# Patient Record
Sex: MALE | HISPANIC OR LATINO | Employment: UNEMPLOYED | URBAN - METROPOLITAN AREA
[De-identification: names, ages, dates, MRNs, and addresses within clinical notes are randomized per-mention and may not be internally consistent; named-entity substitution may affect disease eponyms.]

---

## 2020-03-07 ENCOUNTER — HOSPITAL ENCOUNTER (EMERGENCY)
Facility: HOSPITAL | Age: 35
Discharge: HOME | End: 2020-03-07
Attending: EMERGENCY MEDICINE | Admitting: EMERGENCY MEDICINE

## 2020-03-07 VITALS
DIASTOLIC BLOOD PRESSURE: 97 MMHG | SYSTOLIC BLOOD PRESSURE: 146 MMHG | WEIGHT: 205 LBS | HEIGHT: 70 IN | TEMPERATURE: 98.7 F | HEART RATE: 94 BPM | BODY MASS INDEX: 29.35 KG/M2 | OXYGEN SATURATION: 99 % | RESPIRATION RATE: 18 BRPM

## 2020-03-07 DIAGNOSIS — S61.012A LACERATION OF LEFT THUMB WITHOUT FOREIGN BODY WITHOUT DAMAGE TO NAIL, INITIAL ENCOUNTER: Primary | ICD-10-CM

## 2020-03-07 PROCEDURE — 90715 TDAP VACCINE 7 YRS/> IM: CPT | Performed by: NURSE PRACTITIONER

## 2020-03-07 PROCEDURE — 99282 EMERGENCY DEPT VISIT SF MDM: CPT

## 2020-03-07 PROCEDURE — 25010000003 LIDOCAINE 1 % SOLUTION

## 2020-03-07 PROCEDURE — 25010000002 TDAP 5-2.5-18.5 LF-MCG/0.5 SUSPENSION: Performed by: NURSE PRACTITIONER

## 2020-03-07 PROCEDURE — 90471 IMMUNIZATION ADMIN: CPT | Performed by: NURSE PRACTITIONER

## 2020-03-07 RX ORDER — LIDOCAINE HYDROCHLORIDE 10 MG/ML
10 INJECTION, SOLUTION INFILTRATION; PERINEURAL ONCE
Status: DISCONTINUED | OUTPATIENT
Start: 2020-03-07 | End: 2020-03-07 | Stop reason: HOSPADM

## 2020-03-07 RX ORDER — CEPHALEXIN 500 MG/1
500 CAPSULE ORAL 4 TIMES DAILY
Qty: 20 CAPSULE | Refills: 0 | Status: SHIPPED | OUTPATIENT
Start: 2020-03-07

## 2020-03-07 RX ORDER — HYDROCODONE BITARTRATE AND ACETAMINOPHEN 5; 325 MG/1; MG/1
1 TABLET ORAL EVERY 4 HOURS PRN
Qty: 12 TABLET | Refills: 0 | Status: SHIPPED | OUTPATIENT
Start: 2020-03-07

## 2020-03-07 RX ADMIN — TETANUS TOXOID, REDUCED DIPHTHERIA TOXOID AND ACELLULAR PERTUSSIS VACCINE, ADSORBED 0.5 ML: 5; 2.5; 8; 8; 2.5 SUSPENSION INTRAMUSCULAR at 10:53

## 2020-03-07 NOTE — ED PROVIDER NOTES
EMERGENCY DEPARTMENT ENCOUNTER    CHIEF COMPLAINT  Chief Complaint: laceration  History given by:patient and friend  History limited by:none  Time Seen: 1032  Room Number: 20/20  PMD: Provider, No Known      HPI:  Pt is a 34 y.o. male who presents with laceration to his L thumb that occurred while trying to cut a pipe around 0900 this AM with a knife. Pt is R hand dominant. Pt does report mild tingling to distal L thumb. Unsure of last tdap. Pt is an occasional smoker and socially drinks EtOH.     Pt only speaks Singaporean. Friend at bedside used for Singaporean interpretation.     Duration: 1.5 hours  Timing:constant  Location:L thumb  Intensity/Severity:moderate  Progression:unchanged  Associated Symptoms:tingling to L thumb  Previous Episodes:none  Treatment before arrival:none    PAST MEDICAL HISTORY  Active Ambulatory Problems     Diagnosis Date Noted   • No Active Ambulatory Problems     Resolved Ambulatory Problems     Diagnosis Date Noted   • No Resolved Ambulatory Problems     No Additional Past Medical History       PAST SURGICAL HISTORY  History reviewed. No pertinent surgical history.    FAMILY HISTORY  History reviewed. No pertinent family history.    SOCIAL HISTORY  Social History     Socioeconomic History   • Marital status: Single     Spouse name: Not on file   • Number of children: Not on file   • Years of education: Not on file   • Highest education level: Not on file   Tobacco Use   • Smoking status: Light Tobacco Smoker   Substance and Sexual Activity   • Alcohol use: Yes     Comment: occ         ALLERGIES  Patient has no known allergies.    REVIEW OF SYSTEMS  Review of Systems   Constitutional: Negative for chills and fever.   HENT: Negative for sore throat.    Gastrointestinal: Negative for nausea and vomiting.   Genitourinary: Negative for dysuria.   Musculoskeletal: Negative for back pain.   Skin: Positive for wound (Laceration to L thumb). Negative for rash.   Neurological: Positive for numbness  (tingling to L thumb).   Psychiatric/Behavioral: The patient is not nervous/anxious.        PHYSICAL EXAM  ED Triage Vitals   Temp Heart Rate Resp BP SpO2   03/07/20 1018 03/07/20 1018 03/07/20 1018 03/07/20 1024 03/07/20 1018   98.7 °F (37.1 °C) 94 18 (!) 154/116 99 %       Physical Exam   Constitutional: He is well-developed, well-nourished, and in no distress. No distress.   HENT:   Head: Atraumatic.   Mouth/Throat: Mucous membranes are normal.   Eyes: No scleral icterus.   Neck: Normal range of motion.   Cardiovascular:   2+ L radial pulse  Normal cap refill to L thumb   Pulmonary/Chest: Effort normal.   Musculoskeletal:   3.5 cm laceration to L thumb, decrease extension of L thumb concerning for extensor tendon injury, normal sensation to L thumb   Neurological: He is alert.   Skin: Skin is warm and dry.   Psychiatric: Mood and affect normal.   Nursing note and vitals reviewed.      Laceration Repair  Date/Time: 3/7/2020 11:17 AM  Performed by: Veronica Mohan APRN  Authorized by: Larry Almodovar MD     Consent:     Consent obtained:  Verbal    Consent given by:  Patient    Risks discussed:  Pain  Anesthesia (see MAR for exact dosages):     Anesthesia method:  Local infiltration    Local anesthetic:  Lidocaine 1% w/o epi  Laceration details:     Location:  Finger    Finger location:  L thumb    Length (cm):  3.6  Skin repair:     Repair method:  Sutures    Suture size:  5-0    Suture material:  Nylon    Suture technique:  Simple interrupted    Number of sutures:  5  Approximation:     Approximation:  Close  Post-procedure details:     Dressing:  Antibiotic ointment, splint for protection and non-adherent dressing    Patient tolerance of procedure:  Tolerated well, no immediate complications        PROGRESS AND CONSULTS       1033-Discussed plan to numb the L thumb with lidocaine to perform better examination and laceration repair. The patient indicates understanding of these issues and agrees with the  plan.    1104-Reviewed pt's history and workup with Dr. Nishi MD.  At bedside evaluation, they agree with the plan of care.    1116-Rechecked pt. Pt is resting comfortably. Notified pt of sucessful laceration repair. Informed pt that it is still concerned that he has an extensor tendon injury and that he will need to f/u with hand surgery. Discussed the plan to discharge the pt home. I instructed the pt to f/u with hand surgery as discussed. Washing instructions discussed at length with pt including need to RTER for fever, malodorous drainage, and streaking. Pt understands and agrees with the plan, all questions answered.      Reviewed implications of results, diagnosis, meds, responsibility to follow up, warning signs and symptoms of possible worsening, potential complications and reasons to return to ER with patient.  Discussed all results and noted any abnormalities with patient.  Discussed absolute need to recheck abnormalities with hand surgery.    Discussed plan for discharge, as there is no emergent indication for admission.  Pt is agreeable and understands need for follow up and repeat testing.  Pt is aware that discharge does not mean that nothing is wrong but it indicates no emergency is present.  Pt is discharged with instructions to follow up with primary care doctor to have their blood pressure rechecked.       DIAGNOSIS  Final diagnoses:   Laceration of left thumb without foreign body without damage to nail, initial encounter       FOLLOW UP   Bentley Guillen MD  [address]  [place] [id number] [phone]    Schedule an appointment as soon as possible for a visit       Shade Olivares MD  [address] [facility] [address]  [place] [id number] [phone]    Schedule an appointment as soon as possible for a visit         RX     Medication List      New Prescriptions    cephalexin 500 MG capsule  Commonly known as:  KEFLEX  Take 1 capsule by mouth 4 (Four) Times a Day.    "  HYDROcodone-acetaminophen 5-325 MG per tablet  Commonly known as:  NORCO  Take 1 tablet by mouth Every 4 (Four) Hours As Needed for Moderate Pain .          MARLENE [id number] query complete. Treatment plan to include limited course of prescribed  controlled substance. Risks including addiction, benefits, and alternatives presented to patient.         MEDICATIONS GIVEN IN ER  Medications   lidocaine (XYLOCAINE) 1 % injection 10 mL (has no administration in time range)   Tdap (BOOSTRIX) injection 0.5 mL (0.5 mL Intramuscular Given 3/7/20 1053)       /97   Pulse 94   Temp 98.7 °F (37.1 °C) (Tympanic)   Resp 18   Ht 177.8 cm (70\")   Wt 93 kg (205 lb)   SpO2 99%   BMI 29.41 kg/m²       I personally reviewed the past medical history, past surgical history, social history, family history, current medications and allergies as they appear in this chart.  The scribe's note accurately reflects the work and decisions made by me.     Documentation assistance provided by sherwin Barnes for CECILIO Benavidez on 3/7/2020 at 11:26 AM. Information recorded by the scribe was done at my direction and has been verified and validated by me.         Isha Barnes  03/07/20 1140       Veronica Mohan APRN  03/08/20 1012    "

## 2020-03-07 NOTE — DISCHARGE INSTRUCTIONS
Medications as ordered  Keep dry for 24 hours, then clean daily with soap and water, apply antibiotic ointment  Monitor for signs of infection-redness,swelling,drainage, fever, chills  Follow up with Hand Surgeon-call Monday to schedule appointment  Return to er for fever, chills, signs of infection, pain or any new or worsening symptoms    Community Clinics available for follow up:      [facility] [place]             [address] [facility]  [phone]    [facility]  [address] [facility]  [phone]    [address]  [phone]    [facility]  [address]  [phone]    [facility]  [address] [facility]  [phone]    [facility]  [address]  [phone]    [address]  (off [facility])  [phone]    [facility] [address]  [phone]    [facility]  [address] [facility]  [phone]    Specialty (STD) Clinic  [phone]    [facility]  [address]  [phone]    [facility] at [place]  [address] [facility]  [phone]    [facility]  [address] [facility]  [phone]

## 2020-03-07 NOTE — ED NOTES
Pt reports he was at work trying to cut a pipe and accidentally cut his left thumb.     Darya Pacheco, RN  03/07/20 1017

## 2020-03-07 NOTE — ED PROVIDER NOTES
Pt presents to the ED c/o  left thumb laceration while trying to cut a pipe earlier this morning.  Had some tingling but no complete numbness.  Is able to move the thumb.     On exam,   General: Awake, alert, no acute distress  HEENT: EOMI  Pulm: Symmetric chest rise, nonlabored breathing  CV: Regular rate and rhythm, normal capillary refill in the affected digit  GI: Non-distended  MSK: No deformity, laceration 3.5 cm to the dorsal left thumb with concern for possible partial extensor tendon injury on exploration of the wound.  He maintains flexion and extension of the thumb however.  Skin: Warm, dry  Neuro: Alert and oriented x 3, neurovascularly intact to the affected left thumb, moving all extremities, no focal deficits  Psych: Calm, cooperative    Vital signs and nursing notes reviewed.         Plan:  Wound was cleaned and irrigated, repaired, will be given hand specialty follow-up for further evaluation.  Antibiotics will be initiated as well.       Attestation:  The [facility] and I have discussed this patient's history, physical exam, and treatment plan.  I have reviewed the documentation and personally had a face to face interaction with the patient. I affirm the documentation and agree with the treatment and plan.  The attached note describes my personal findings.            Larry Almodovar MD  03/07/20 6930

## 2020-03-07 NOTE — ED NOTES
Antibiotic oint applied to site, wound dressed with sterile gauze. Gauze wrapped around thumb, alluminum splint placed on thumb, secured with roll gauze, tape.      Luiz Wise RN  03/07/20 9029